# Patient Record
Sex: MALE | Race: BLACK OR AFRICAN AMERICAN | NOT HISPANIC OR LATINO
[De-identification: names, ages, dates, MRNs, and addresses within clinical notes are randomized per-mention and may not be internally consistent; named-entity substitution may affect disease eponyms.]

---

## 2023-07-13 PROBLEM — Z00.00 ENCOUNTER FOR PREVENTIVE HEALTH EXAMINATION: Status: ACTIVE | Noted: 2023-07-13

## 2023-07-19 ENCOUNTER — NON-APPOINTMENT (OUTPATIENT)
Age: 28
End: 2023-07-19

## 2023-07-20 ENCOUNTER — NON-APPOINTMENT (OUTPATIENT)
Age: 28
End: 2023-07-20

## 2023-07-20 ENCOUNTER — APPOINTMENT (OUTPATIENT)
Dept: UROLOGY | Facility: CLINIC | Age: 28
End: 2023-07-20
Payer: COMMERCIAL

## 2023-07-20 VITALS
WEIGHT: 210 LBS | HEIGHT: 70 IN | SYSTOLIC BLOOD PRESSURE: 136 MMHG | BODY MASS INDEX: 30.06 KG/M2 | TEMPERATURE: 98 F | DIASTOLIC BLOOD PRESSURE: 79 MMHG | HEART RATE: 59 BPM

## 2023-07-20 DIAGNOSIS — E29.1 TESTICULAR HYPOFUNCTION: ICD-10-CM

## 2023-07-20 PROCEDURE — 99204 OFFICE O/P NEW MOD 45 MIN: CPT | Mod: 25,57

## 2023-07-20 PROCEDURE — 93975 VASCULAR STUDY: CPT

## 2023-07-20 NOTE — HISTORY OF PRESENT ILLNESS
[FreeTextEntry1] : ZEYNEP DICKERSON is a 28 year M with no PMHx presenting for hypogonadism, erectile dysfunction on 07/20/2023\par in arealationship with Jenifer Wellington (28F)\par \par  trying to conceive x 1 year, never demonstrated fertility. Attempted conception during ovulation mapping. underwent 2 SA both demonstrated azoospermia. endorses some poor energy and decreased libido. endorses erections can fluctuate from 4-8 /10. \par \par \par FSH 10.9\par LH 6.7\par 46xy\par Y deletion negative\par CF sent - result not able to be viewed\par \par He denies fevers, chills flank pain, hx of kidney stones, urinary complaints. \par \par \par Social history; social drinking\par \par Family history: denies\par \par Allergies: NKDA\par

## 2023-07-20 NOTE — PHYSICAL EXAM
[Normal Appearance] : normal appearance [General Appearance - In No Acute Distress] : no acute distress [Edema] : no peripheral edema [] : no respiratory distress [Respiration, Rhythm And Depth] : normal respiratory rhythm and effort [Exaggerated Use Of Accessory Muscles For Inspiration] : no accessory muscle use [Abdomen Soft] : soft [Abdomen Tenderness] : non-tender [Abdomen Hernia] : no hernia was discovered [Urethral Meatus] : meatus normal [Penis Abnormality] : normal uncircumcised penis [Urinary Bladder Findings] : the bladder was normal on palpation [Scrotum] : the scrotum was normal [Testes Tenderness] : no tenderness of the testes [Testes Mass (___cm)] : there were no testicular masses [Normal Station and Gait] : the gait and station were normal for the patient's age [Oriented To Time, Place, And Person] : oriented to person, place, and time [Affect] : the affect was normal [FreeTextEntry1] : grade I bilateral varicocele. 8-10 cc testes no masses. flat epididymis. easily palp vasa.

## 2023-07-20 NOTE — LETTER BODY
[Dear  ___] : Dear  [unfilled], [FreeTextEntry2] : Suzanne Soler MD\par [facility]\par [address]\[place] [address] [FreeTextEntry1] : I had the pleasure of seeing ZEYNEP DICKERSON, a 28 year old man,  to your patient Jenifer Wellington, in the office in consultation today. Please see the attached note for full details.\par \par Thank you very much for allowing me to participate in the care of this patient. If you have any questions please feel free to call me at any time. \par \par \par Sincerely yours,\par \par \par \par Dylon Quezada MD, PRO\par Director, Male Fertility and Microsurgery\par  of Urology\par [facility]\par

## 2023-07-20 NOTE — ASSESSMENT
[FreeTextEntry1] : hypogonadism\par azoospermia\par scortal ultrasound today - no varicocele noted\par I had a long conversation with him and his wife regarding the role of surgical sperm retrieval. They understand that some patients harbor pockets of spermatogenesis within the testis that can be retrieved surgically and used with IVF. In general, there is an approximately 45-60% likelihood of successful retrieval. Retrieval options, including a complete microdissection of the testis and more limited testicular biopsies were also discussed. They understand that the microTESE may have the best likelihood of identifying spermatogenesis. Risks of surgery, including hematoma, pain, long term hypogonadism requiring testosterone replacement therapy, were also discussed.\par We spoke at length about the role of office based TESE with possible percutaneous epididymal sperm aspiration. Risks- bleeding, pain, infection, hypogonadism all discussed.  Risk of secondary epididymal obstruction also discussed. Procedure will be done under local anesthesia. He understands that sperm yields tend to be lower with this and that he may require future procedure for repeat sperm extraction if IVF attempts fail.\par opts to proceed with office based TESE\par will schedule\par

## 2023-08-01 ENCOUNTER — APPOINTMENT (OUTPATIENT)
Dept: UROLOGY | Facility: CLINIC | Age: 28
End: 2023-08-01

## 2023-08-10 RX ORDER — CEPHALEXIN 500 MG/1
500 CAPSULE ORAL
Qty: 3 | Refills: 0 | Status: ACTIVE | COMMUNITY
Start: 2023-08-10 | End: 1900-01-01

## 2023-08-10 RX ORDER — ACETAMINOPHEN AND CODEINE PHOSPHATE 300; 30 MG/1; MG/1
300-30 TABLET ORAL
Qty: 6 | Refills: 0 | Status: ACTIVE | COMMUNITY
Start: 2023-08-10 | End: 1900-01-01

## 2023-08-11 ENCOUNTER — APPOINTMENT (OUTPATIENT)
Dept: UROLOGY | Facility: CLINIC | Age: 28
End: 2023-08-11
Payer: COMMERCIAL

## 2023-08-11 VITALS — SYSTOLIC BLOOD PRESSURE: 136 MMHG | HEART RATE: 57 BPM | TEMPERATURE: 97.6 F | DIASTOLIC BLOOD PRESSURE: 88 MMHG

## 2023-08-11 PROCEDURE — 54505 BIOPSY OF TESTIS: CPT | Mod: LT
